# Patient Record
Sex: MALE | Race: WHITE | NOT HISPANIC OR LATINO
[De-identification: names, ages, dates, MRNs, and addresses within clinical notes are randomized per-mention and may not be internally consistent; named-entity substitution may affect disease eponyms.]

---

## 2023-06-23 PROBLEM — Z00.00 ENCOUNTER FOR PREVENTIVE HEALTH EXAMINATION: Status: ACTIVE | Noted: 2023-06-23

## 2023-07-20 ENCOUNTER — APPOINTMENT (OUTPATIENT)
Dept: ORTHOPEDIC SURGERY | Facility: CLINIC | Age: 33
End: 2023-07-20
Payer: SELF-PAY

## 2023-07-20 VITALS — HEIGHT: 72 IN | WEIGHT: 195 LBS | BODY MASS INDEX: 26.41 KG/M2

## 2023-07-20 DIAGNOSIS — M53.3 SACROCOCCYGEAL DISORDERS, NOT ELSEWHERE CLASSIFIED: ICD-10-CM

## 2023-07-20 PROCEDURE — 72170 X-RAY EXAM OF PELVIS: CPT

## 2023-07-20 PROCEDURE — 99203 OFFICE O/P NEW LOW 30 MIN: CPT

## 2023-07-20 NOTE — IMAGING
[de-identified] : LSPINE\par skin intact over coccyx\par M/S non-focal [AP] : anteroposterior [There are no fractures, subluxations or dislocations. No significant abnormalities are seen] : There are no fractures, subluxations or dislocations. No significant abnormalities are seen

## 2023-07-20 NOTE — HISTORY OF PRESENT ILLNESS
[Sudden] : sudden [6] : 6 [Dull/Aching] : dull/aching [Radiating] : radiating [Intermittent] : intermittent [Sitting] : sitting [Lying in bed] : lying in bed [de-identified] : 7/20/23-\par GEOVANY ADAMES is a 32 year y.o. M that is here today complaining of pain in his tailbone. Onset: about 6 months ago. Pt states that he slipped while playing hockey and landed on the lower back/buttock area. Pt feels pain when sitting along with the motion of getting up after sitting. Has had intermittent tingling down the LLE glutes to thigh in years prior. No bb dysfunction.  [] : no [FreeTextEntry3] : 01/23 [FreeTextEntry7] : Down to left leg  [FreeTextEntry9] : Advil

## 2023-08-02 ENCOUNTER — APPOINTMENT (OUTPATIENT)
Dept: ORTHOPEDIC SURGERY | Facility: CLINIC | Age: 33
End: 2023-08-02